# Patient Record
(demographics unavailable — no encounter records)

---

## 2024-12-04 NOTE — FAMILY HISTORY
[de-identified] : mom reportedly 160 cm and 115 lb; dad reportedly 170 cm and 155 lb [Initial Evaluation] : an initial evaluation [FreeTextEntry1] : dyspepsia, possible celiac disease

## 2024-12-04 NOTE — HISTORY OF PRESENT ILLNESS
[de-identified] : Sade is a 15 month old ex-37 week female toddler who is referred by Dr Kunz (PMD) in consultation for abdominal discomfort and milk intolerance.  Since 1 month old, she is gassy, distended, and wakes up every 1-2 hours crying overnight.  She goes back to sleep after being held for 5-10 min, but wakes up crying in 1-2 hours. She no longer feeds overnight since she is older but when she was younger, feeding did not help with crying.  Fine during daytime.  Had lead drawn (reportedly normal) at 9 mo.  Has not tried meds.  No travel.  Tried various formulas (in this order): regular Enfamil, NeuroPro, Gentlease, Sim Total Care 360, Lactaid milk but all unhelpful with gas. She became constipated at 13-14 mo on A2 then Lactaid milk. Drinks 400 ml of whole Lactaid milk, 80 ml of water, and a small amount of noodle soup a day.  Rarely drinks juice.  Eats 3 meals a day: rice, noodles, meat, veg.  Per parents, she has not gained much weight nor grown linearly the past few months.  The distention is always there but may be more after eating.  No vomiting.   Sade stools every 1>2 days, [place] 1, with straining and sometimes pain.  There was one episode with a small amount of "red."  There is no mucus, steatorrhea, or diarrhea.  Takes MIralax 5g in 6 oz water but takes med inconsistently and she does not drink all of it.

## 2024-12-04 NOTE — HISTORY OF PRESENT ILLNESS
[de-identified] : Sade is a 15 month old ex-37 week female toddler who is referred by Dr Kunz (PMD) in consultation for abdominal discomfort and milk intolerance.  Since 1 month old, she is gassy, distended, and wakes up every 1-2 hours crying overnight.  She goes back to sleep after being held for 5-10 min, but wakes up crying in 1-2 hours. She no longer feeds overnight since she is older but when she was younger, feeding did not help with crying.  Fine during daytime.  Had lead drawn (reportedly normal) at 9 mo.  Has not tried meds.  No travel.  Tried various formulas (in this order): regular Enfamil, NeuroPro, Gentlease, Sim Total Care 360, Lactaid milk but all unhelpful with gas. She became constipated at 13-14 mo on A2 then Lactaid milk. Drinks 400 ml of whole Lactaid milk, 80 ml of water, and a small amount of noodle soup a day.  Rarely drinks juice.  Eats 3 meals a day: rice, noodles, meat, veg.  Per parents, she has not gained much weight nor grown linearly the past few months.  The distention is always there but may be more after eating.  No vomiting.   Sade stools every 1>2 days, [place] 1, with straining and sometimes pain.  There was one episode with a small amount of "red."  There is no mucus, steatorrhea, or diarrhea.  Takes MIralax 5g in 6 oz water but takes med inconsistently and she does not drink all of it.

## 2024-12-04 NOTE — REASON FOR VISIT
[Consultation] : a consultation visit [Parents] : parents [Other: ______] : provided by DAMION [Time Spent: ____ minutes] : Total time spent using  services: [unfilled] minutes. The patient's primary language is not English thus required  services. [Interpreters_IDNumber] : [id number] (disconnected) then [id number] [FreeTextEntry3] : Mandarin

## 2024-12-04 NOTE — CONSULT LETTER
[Dear  ___] : Dear ~LAVERNE, [Consult Letter:] : I had the pleasure of evaluating your patient, [unfilled]. [Please see my note below.] : Please see my note below. [Consult Closing:] : Thank you very much for allowing me to participate in the care of this patient.  If you have any questions, please do not hesitate to contact me. [Sincerely,] : Sincerely, [FreeTextEntry3] : Nora Mcclelland MD [facility]

## 2024-12-04 NOTE — ASSESSMENT
[Educated Patient & Family about Diagnosis] : educated the patient and family about the diagnosis [Discussed with Family to Call in ____ week(s) for Test Results] : discussed with family to call in [unfilled] week(s) to obtain test results and with update on child's condition.  Family should call sooner if clinically indicated. [FreeTextEntry1] : ASSESSMENT: 1.  Abdominal distention, gas, crying overnight since 1 month old - unresponsive to various formulas/milk including Lactaid milk.  The differential diagnosis includes but is not limited to aerophagia (from crying overnight), cow's milk allergy, gas pain, constipation (which pt has recently though not in infancy), dysmotility, Hirschsprung's disease, celiac disease, metabolic disorder, SIBO, organomegaly. 2.  Acute constipation - inconsistent with Miralax, difficulty drinking all of med 3.  Reportedly poor weight gain and linear growth recently - weight 55% and length 4% today in clinic, weight-for-length 89%  PLAN: -  Labs to be drawn at [facility] [place].  Family wants to proceed with ordering TSH even if not covered by insurance.  -  KUB which family prefers to get tomorrow at [facility].  -  Contacted Radiology to help family schedule US via Mandarin . -  Will give samples of hypoallergenic formula: Cristina Palma Jr's Ped Standard 1.2 chocoCristina ortiz's Ped Peptide 1.0 vanilla.  -  Take probiotics consistently.  Start Mylicon infant.   -  Glycerin suppository or Windi for gas. -  Change Miralax to senna liquid 5 ml once daily (e-scribed).  -  Consider workup for Hirschsprung's disease, metabolic disorder, Flagyl. -  Requested AA to obtain PMD labs and growth curves. -  Follow up in GI clinic on 1/6 at 11a.  Plan reviewed.  Family to call if problems.  All questions answered.

## 2024-12-04 NOTE — CONSULT LETTER
[Dear  ___] : Dear ~LAVERNE, [Consult Letter:] : I had the pleasure of evaluating your patient, [unfilled]. [Please see my note below.] : Please see my note below. [Consult Closing:] : Thank you very much for allowing me to participate in the care of this patient.  If you have any questions, please do not hesitate to contact me. [Sincerely,] : Sincerely, [FreeTextEntry3] : Nora Mcclelland MD [facility]

## 2024-12-04 NOTE — REASON FOR VISIT
[Consultation] : a consultation visit [Parents] : parents [Other: ______] : provided by DAMION [Time Spent: ____ minutes] : Total time spent using  services: [unfilled] minutes. The patient's primary language is not English thus required  services. [Interpreters_IDNumber] : [id number] (disconnected) then [id number] [FreeTextEntry3] : Mandarin

## 2024-12-04 NOTE — PHYSICAL EXAM
[Well Developed] : well developed [Well Nourished] : well nourished [NAD] : in no acute distress [Alert and Active] : alert and active [Moist & Pink Mucous Membranes] : moist and pink mucous membranes [CTAB] : lungs clear to auscultation bilaterally [Regular Rate and Rhythm] : regular rate and rhythm [Normal S1, S2] : normal S1 and S2 [Soft] : soft  [Distended] : distended [Normal Bowel Sounds] : normal bowel sounds [No Back Lesion] : no back lesion [Normal rectal exam] : exam was normal [Rectal Exam Deferred] : rectal exam was deferred [Well-Perfused] : well-perfused [Short For Stated Age] : short for stated age [icteric] : anicteric [Respiratory Distress] : no respiratory distress  [Joint Swelling] : no joint swelling [Edema] : no edema [Cyanosis] : no cyanosis [Rash] : no rash [Jaundice] : no jaundice [FreeTextEntry1] : plump thighs > cheeks [de-identified] : uncooperative with exam  [de-identified] : uncooperative with exam  [de-identified] : uncooperative with exam  [de-identified] : did not speak during visit [de-identified] : crying during exam, comfortable when not examined

## 2024-12-04 NOTE — PHYSICAL EXAM
[Well Developed] : well developed [Well Nourished] : well nourished [NAD] : in no acute distress [Alert and Active] : alert and active [Moist & Pink Mucous Membranes] : moist and pink mucous membranes [CTAB] : lungs clear to auscultation bilaterally [Regular Rate and Rhythm] : regular rate and rhythm [Normal S1, S2] : normal S1 and S2 [Soft] : soft  [Distended] : distended [Normal Bowel Sounds] : normal bowel sounds [No Back Lesion] : no back lesion [Normal rectal exam] : exam was normal [Rectal Exam Deferred] : rectal exam was deferred [Well-Perfused] : well-perfused [Short For Stated Age] : short for stated age [icteric] : anicteric [Respiratory Distress] : no respiratory distress  [Joint Swelling] : no joint swelling [Edema] : no edema [Cyanosis] : no cyanosis [Rash] : no rash [Jaundice] : no jaundice [FreeTextEntry1] : plump thighs > cheeks [de-identified] : uncooperative with exam  [de-identified] : uncooperative with exam  [de-identified] : uncooperative with exam  [de-identified] : did not speak during visit [de-identified] : crying during exam, comfortable when not examined

## 2024-12-04 NOTE — ASSESSMENT
[Educated Patient & Family about Diagnosis] : educated the patient and family about the diagnosis [Discussed with Family to Call in ____ week(s) for Test Results] : discussed with family to call in [unfilled] week(s) to obtain test results and with update on child's condition.  Family should call sooner if clinically indicated. [FreeTextEntry1] : ASSESSMENT: 1.  Abdominal distention, gas, crying overnight since 1 month old - unresponsive to various formulas/milk including Lactaid milk.  The differential diagnosis includes but is not limited to aerophagia (from crying overnight), cow's milk allergy, gas pain, constipation (which pt has recently though not in infancy), dysmotility, Hirschsprung's disease, celiac disease, metabolic disorder, SIBO, organomegaly. 2.  Acute constipation - inconsistent with Miralax, difficulty drinking all of med 3.  Reportedly poor weight gain and linear growth recently - weight 55% and length 4% today in clinic, weight-for-length 89%  PLAN: -  [place] [facility] [place].  Family wants to proceed with ordering TSH even if not covered by insurance.  -  KUB which family prefers to get tomorrow at [facility].  -  Contacted Radiology to help family schedule US via Mandarin . -  Will give samples of hypoallergenic formula: Cristina Palma Jr's Ped Standard 1.2 chocoCristina ortiz's Ped Peptide 1.0 vanilla.  -  Take probiotics consistently.  Start Mylicon infant.   -  Glycerin suppository or Windi for gas. -  Change Miralax to senna liquid 5 ml once daily (e-scribed).  -  Consider workup for Hirschsprung's disease, metabolic disorder, Flagyl. -  Requested AA to obtain PMD labs and growth curves. -  Follow up in GI clinic on 1/6 at 11a.  Plan reviewed.  Family to call if problems.  All questions answered.

## 2024-12-04 NOTE — REVIEW OF SYSTEMS
[Negative] : Skin [Fever] : no fever [Asthma] : no asthma [Pneumonia] : no pneumonia [Murmur] : no murmur [Rash] : no rash [FreeTextEntry8] : 5 wet diapers a day  [FreeTextEntry1] : walks, says baba giles, delmy-delmy (little sister in Chinese0, bimal-bimal (grandmother in Chinese), bye-bye

## 2024-12-04 NOTE — PHYSICAL EXAM
[Well Developed] : well developed [Well Nourished] : well nourished [NAD] : in no acute distress [Alert and Active] : alert and active [Moist & Pink Mucous Membranes] : moist and pink mucous membranes [CTAB] : lungs clear to auscultation bilaterally [Regular Rate and Rhythm] : regular rate and rhythm [Normal S1, S2] : normal S1 and S2 [Soft] : soft  [Distended] : distended [Normal Bowel Sounds] : normal bowel sounds [No Back Lesion] : no back lesion [Normal rectal exam] : exam was normal [Rectal Exam Deferred] : rectal exam was deferred [Well-Perfused] : well-perfused [Short For Stated Age] : short for stated age [icteric] : anicteric [Respiratory Distress] : no respiratory distress  [Joint Swelling] : no joint swelling [Edema] : no edema [Cyanosis] : no cyanosis [Rash] : no rash [Jaundice] : no jaundice [FreeTextEntry1] : plump thighs > cheeks [de-identified] : uncooperative with exam  [de-identified] : uncooperative with exam  [de-identified] : uncooperative with exam  [de-identified] : did not speak during visit [de-identified] : crying during exam, comfortable when not examined

## 2024-12-04 NOTE — REASON FOR VISIT
[Consultation] : a consultation visit [Parents] : parents [Other: ______] : provided by DAMION [Time Spent: ____ minutes] : Total time spent using  services: [unfilled] minutes. The patient's primary language is not English thus required  services. [Interpreters_IDNumber] : [id number] (disconnected) then [id number] [FreeTextEntry3] : Mandarin

## 2024-12-04 NOTE — ASSESSMENT
[Educated Patient & Family about Diagnosis] : educated the patient and family about the diagnosis [Discussed with Family to Call in ____ week(s) for Test Results] : discussed with family to call in [unfilled] week(s) to obtain test results and with update on child's condition.  Family should call sooner if clinically indicated. [FreeTextEntry1] : ASSESSMENT: 1.  Abdominal distention, gas, crying overnight since 1 month old - unresponsive to various formulas/milk including Lactaid milk.  The differential diagnosis includes but is not limited to aerophagia (from crying overnight), cow's milk allergy, gas pain, constipation (which pt has recently though not in infancy), dysmotility, Hirschsprung's disease, celiac disease, metabolic disorder, SIBO, organomegaly. 2.  Acute constipation - inconsistent with Miralax, difficulty drinking all of med 3.  Reportedly poor weight gain and linear growth recently - weight 55% and length 4% today in clinic, weight-for-length 89%  PLAN: -  [place] [facility].  Family wants to proceed with ordering TSH even if not covered by insurance.  -  KUB which family prefers to get tomorrow at [facility] Radiology.  -  Contacted Radiology to help family schedule US via Mandarin . -  Will give samples of hypoallergenic formula: Cristina Palma Jr's Ped Standard 1.2 chocoCristina ortiz's Ped Peptide 1.0 vanilla.  -  Take probiotics consistently.  Start Mylicon infant.   -  Glycerin suppository or Windi for gas. -  Change Miralax to senna liquid 5 ml once daily (e-scribed).  -  Consider workup for Hirschsprung's disease, metabolic disorder, Flagyl. -  Requested AA to obtain PMD labs and growth curves. -  Follow up in GI clinic on 1/6 at 11a.  Plan reviewed.  Family to call if problems.  All questions answered.

## 2024-12-04 NOTE — CONSULT LETTER
[Dear  ___] : Dear ~LAVERNE, [Consult Letter:] : I had the pleasure of evaluating your patient, [unfilled]. [Please see my note below.] : Please see my note below. [Consult Closing:] : Thank you very much for allowing me to participate in the care of this patient.  If you have any questions, please do not hesitate to contact me. [Sincerely,] : Sincerely, [FreeTextEntry3] : Nora Mcclelland MD [facility]

## 2024-12-04 NOTE — PAST MEDICAL HISTORY
[At ___ Weeks Gestation] : at [unfilled] weeks gestation [Normal Vaginal Route] : by normal vaginal route [None] : there were no delivery complications [] : There were no problems passing meconium within 24 - 48 hrs of life [FreeTextEntry1] : 5 lb 6 oz

## 2024-12-04 NOTE — HISTORY OF PRESENT ILLNESS
[de-identified] : Sade is a 15 month old ex-37 week female toddler who is referred by Dr Kunz (PMD) in consultation for abdominal discomfort and milk intolerance.  Since 1 month old, she is gassy, distended, and wakes up every 1-2 hours crying overnight.  She goes back to sleep after being held for 5-10 min, but wakes up crying in 1-2 hours. She no longer feeds overnight since she is older but when she was younger, feeding did not help with crying.  Fine during daytime.  Had lead drawn (reportedly normal) at 9 mo.  Has not tried meds.  No travel.  Tried various formulas (in this order): regular Enfamil, NeuroPro, Gentlease, Sim Total Care 360, Lactaid milk but all unhelpful with gas. She became constipated at 13-14 mo on A2 then Lactaid milk. Drinks 400 ml of whole Lactaid milk, 80 ml of water, and a small amount of noodle soup a day.  Rarely drinks juice.  Eats 3 meals a day: rice, noodles, meat, veg.  Per parents, she has not gained much weight nor grown linearly the past few months.  The distention is always there but may be more after eating.  No vomiting.   Sade stools every 1>2 days, [place] 1, with straining and sometimes pain.  There was one episode with a small amount of "red."  There is no mucus, steatorrhea, or diarrhea.  Takes MIralax 5g in 6 oz water but takes med inconsistently and she does not drink all of it.

## 2024-12-04 NOTE — SOCIAL HISTORY
[Parent(s)] : parent(s) [FreeTextEntry1] : does not attend   O-Z Plasty Text: The defect edges were debeveled with a #15 scalpel blade.  Given the location of the defect, shape of the defect and the proximity to free margins an O-Z plasty (double transposition flap) was deemed most appropriate.  Using a sterile surgical marker, the appropriate transposition flaps were drawn incorporating the defect and placing the expected incisions within the relaxed skin tension lines where possible.    The area thus outlined was incised deep to adipose tissue with a #15 scalpel blade.  The skin margins were undermined to an appropriate distance in all directions utilizing iris scissors.  Hemostasis was achieved with electrocautery.  The flaps were then transposed into place, one clockwise and the other counterclockwise, and anchored with interrupted buried subcutaneous sutures.

## 2025-01-08 NOTE — CONSULT LETTER
[Dear  ___] : Dear ~LAVERNE, [Consult Letter:] : I had the pleasure of evaluating your patient, [unfilled]. [Please see my note below.] : Please see my note below. [Consult Closing:] : Thank you very much for allowing me to participate in the care of this patient.  If you have any questions, please do not hesitate to contact me. [Sincerely,] : Sincerely, [FreeTextEntry3] : Nora Mcclelland MD [facility]

## 2025-01-08 NOTE — PHYSICAL EXAM
[Well Developed] : well developed [Well Nourished] : well nourished [NAD] : in no acute distress [Alert and Active] : alert and active [Short For Stated Age] : short for stated age [Moist & Pink Mucous Membranes] : moist and pink mucous membranes [CTAB] : lungs clear to auscultation bilaterally [Regular Rate and Rhythm] : regular rate and rhythm [Normal S1, S2] : normal S1 and S2 [Soft] : soft  [Distended] : distended [Normal Bowel Sounds] : normal bowel sounds [No Back Lesion] : no back lesion [Normal rectal exam] : exam was normal [Rectal Exam Deferred] : rectal exam was deferred [Well-Perfused] : well-perfused [de-identified] : uncooperative with exam  [icteric] : anicteric [Respiratory Distress] : no respiratory distress  [Joint Swelling] : no joint swelling [Edema] : no edema [Cyanosis] : no cyanosis [Rash] : no rash [Jaundice] : no jaundice [FreeTextEntry1] : plump thighs > cheeks [de-identified] : uncooperative with exam  [de-identified] : uncooperative with exam  [de-identified] : did not speak during visit [de-identified] : crying during exam, comfortable when not examined

## 2025-01-08 NOTE — HISTORY OF PRESENT ILLNESS
[de-identified] : Sade is a 16 month old ex-37 week female toddler who is here for follow up of fussiness overnight, abdominal distention, constipation and gas.  Since 1 month old, she is gassy, distended, and wakes up every 1-2 hours crying overnight.  She goes back to sleep after being held for 5-10 min, but wakes up crying in 1-2 hours. She no longer feeds overnight since she is older but when she was younger, feeding did not help with crying.  Fine during daytime.    Since the last visit on 12/2, the constipation and distention both significantly improved after switching from Lactaid whole milk to Lactaid 2% milk on Jan 2.  The stool consistency changed from [place] 1 to loose. She stools once a day, without straining or pain.  There is no blood, mucus, steatorrhea, or diarrhea. Off laxatives.  She refused supp. Did not start Mylicon.    Sade drinks Lactaid 2% milk 7-8 oz BID-TID.  Did not like Elecare Jr Cristina Farm's chocolate or vanilla. [In the past, she tried various formulas (in this order): regular Enfamil, NeuroPro, Gentlease, Sim Total Care 360, Lactaid milk but all unhelpful with gas. She became constipated at 13-14 mo on A2 then Lactaid milk.]   Eats 3 meals a day: rice, noodles, meat, veg.  Gained weight from 55% to 60%.  No vomiting.    Sade continues to wake up overnight.  Last feed is at 8-9p, she sleeps at 9:30p, cries at 10p.  Wakes up 30 min after bedtime regardless of when she sleeps.  Then wakes up every 1-2 hour, may or may not cry.  Sometimes does not wake up but tosses in sleep. No longer feeds overnight.  Happy during daytime.   PMD labs 6/3/24: - CBC with plt 536 - normal lead level  GI WORKUP:  - low bicarb 12 (took 12h to process) with high anion gap 19.  Repeat bicarb 21 without anion gap. - normal CBC (including plt) but ANC 1400 - normal CRP, lipase, TSH, celiac panel with normal serum IgA - Plasma amino acid analysis reveals variations from the normal reference range for several amino acids.  The pattern is not suggestive of a specific aminoacidopathy. This pattern may be due to differences in normal metabolism, patient diet, or treatment. - Plasma acylcarnitine analysis revealed an elevation of C3. The elevation of C3 is mild and is unlikely of clinical significance. However, if an inborn error of metabolism is strong suspected, consider obtaining urine organic acids to correlate with this result. -  normal T+F carnitine -  UA with 30 protein and trace leuk (neg nit), pH 6.0 - normal celiac panel with serum IgA - normal US - KUB with moderate stool burden

## 2025-01-08 NOTE — CONSULT LETTER
[Dear  ___] : Dear ~LAVERNE, [Consult Letter:] : I had the pleasure of evaluating your patient, [unfilled]. [Please see my note below.] : Please see my note below. [Consult Closing:] : Thank you very much for allowing me to participate in the care of this patient.  If you have any questions, please do not hesitate to contact me. [Sincerely,] : Sincerely, [FreeTextEntry3] : Nora Mcclelland MD [facility]

## 2025-01-08 NOTE — REASON FOR VISIT
[Parents] : parents [Other: ______] : provided by DAMION [Consultation Follow Up] : a consultation follow up  [Time Spent: ____ minutes] : Total time spent using  services: [unfilled] minutes. The patient's primary language is not English thus required  services. [Interpreters_IDNumber] : [id number] [FreeTextEntry3] : Mandarin

## 2025-01-08 NOTE — ASSESSMENT
[Discussed with Family to Call in ____ week(s) for Test Results] : discussed with family to call in [unfilled] week(s) to obtain test results and with update on child's condition.  Family should call sooner if clinically indicated. [FreeTextEntry1] : ASSESSMENT: 1.  Abdominal distention, gas, crying overnight since 1 month old - unresponsive to various formulas/milk including Lactaid milk.  Distention and constipation improved when changed Lactaid whole milk to Lactaid 2% milk.  Gaining weight.  2.  Workup significant for mild neutropenia (ANC 1400), and UA with 30 protein and trace leuk (neg nit).  No specific metabolic disorder found on workup.  Normal US.  KUB with moderate stool burden.   PLAN: -  Informed family that pt should be whole milk at this time so can gradually transition back to whole milk.  -  Prune juice/puree > senna liquid 5 ml once daily prn constipation.   -  Continue probiotics.  Mylicon qhs to see if helpful with overnight fussiness. -  Divide bedtime feed into two smaller feeds.  Start Pepcid 0.5 mg/kg before nighttime feed (e-scribed).  -  Repeat CBC/diff and UA, per PMD (both ordered). Confirmed with lab that UrOA pending result.  -  FOBT.  Consider calprotectin.   -  Family deferred EGD and seeing Metabolic.  Requested family to trial one night where they feed pt when she wakes up crying overnight to see if it helps, then call me with result.  If helpful, will refer to Metabolic.   -  Follow up in GI clinic in 2 months. Family to call if problems.  All questions answered.   ADDENDUM: Jan 6, 2025 - Normal urine OA. Requested [place] [facility] to call family with result.  MOC states pt still cried after taking 100 ml of formula overnight.

## 2025-01-08 NOTE — HISTORY OF PRESENT ILLNESS
[de-identified] : Sade is a 16 month old ex-37 week female toddler who is here for follow up of fussiness overnight, abdominal distention, constipation and gas.  Since 1 month old, she is gassy, distended, and wakes up every 1-2 hours crying overnight.  She goes back to sleep after being held for 5-10 min, but wakes up crying in 1-2 hours. She no longer feeds overnight since she is older but when she was younger, feeding did not help with crying.  Fine during daytime.    Since the last visit on 12/2, the constipation and distention both significantly improved after switching from Lactaid whole milk to Lactaid 2% milk on Jan 2.  The stool consistency changed from [place] 1 to loose. She stools once a day, without straining or pain.  There is no blood, mucus, steatorrhea, or diarrhea. Off laxatives.  She refused supp. Did not start Mylicon.    Sade drinks Lactaid 2% milk 7-8 oz BID-TID.  Did not like [employer]'s chocolate or vanilla. [In the past, she tried various formulas (in this order): regular Enfamil, NeuroPro, Gentlease, Sim Total Care 360, Lactaid milk but all unhelpful with gas. She became constipated at 13-14 mo on A2 then Lactaid milk.]   Eats 3 meals a day: rice, noodles, meat, veg.  Gained weight from 55% to 60%.  No vomiting.    Sade continues to wake up overnight.  Last feed is at 8-9p, she sleeps at 9:30p, cries at 10p.  Wakes up 30 min after bedtime regardless of when she sleeps.  Then wakes up every 1-2 hour, may or may not cry.  Sometimes does not wake up but tosses in sleep. No longer feeds overnight.  Happy during daytime.   PMD labs 6/3/24: - CBC with plt 536 - normal lead level  GI WORKUP:  - low bicarb 12 (took 12h to process) with high anion gap 19.  Repeat bicarb 21 without anion gap. - normal CBC (including plt) but ANC 1400 - normal CRP, lipase, TSH, celiac panel with normal serum IgA - Plasma amino acid analysis reveals variations from the normal reference range for several amino acids.  The pattern is not suggestive of a specific aminoacidopathy. This pattern may be due to differences in normal metabolism, patient diet, or treatment. - Plasma acylcarnitine analysis revealed an elevation of C3. The elevation of C3 is mild and is unlikely of clinical significance. However, if an inborn error of metabolism is strong suspected, consider obtaining urine organic acids to correlate with this result. -  normal T+F carnitine -  UA with 30 protein and trace leuk (neg nit), pH 6.0 - normal celiac panel with serum IgA - normal US - KUB with moderate stool burden

## 2025-01-08 NOTE — PHYSICAL EXAM
[Well Developed] : well developed [Well Nourished] : well nourished [NAD] : in no acute distress [Alert and Active] : alert and active [Short For Stated Age] : short for stated age [Moist & Pink Mucous Membranes] : moist and pink mucous membranes [CTAB] : lungs clear to auscultation bilaterally [Regular Rate and Rhythm] : regular rate and rhythm [Normal S1, S2] : normal S1 and S2 [Soft] : soft  [Distended] : distended [Normal Bowel Sounds] : normal bowel sounds [No Back Lesion] : no back lesion [Normal rectal exam] : exam was normal [Rectal Exam Deferred] : rectal exam was deferred [Well-Perfused] : well-perfused [de-identified] : uncooperative with exam  [icteric] : anicteric [Respiratory Distress] : no respiratory distress  [Joint Swelling] : no joint swelling [Edema] : no edema [Cyanosis] : no cyanosis [Rash] : no rash [Jaundice] : no jaundice [FreeTextEntry1] : plump thighs > cheeks [de-identified] : uncooperative with exam  [de-identified] : uncooperative with exam  [de-identified] : did not speak during visit [de-identified] : crying during exam, comfortable when not examined

## 2025-01-08 NOTE — REVIEW OF SYSTEMS
[Negative] : Allergy/Immunology [Eczema] : eczema [Fever] : no fever [Asthma] : no asthma [Pneumonia] : no pneumonia [Murmur] : no murmur [Rash] : no rash [FreeTextEntry8] : 5 wet diapers a day  [FreeTextEntry1] : run, says baba giles, 4-5 words

## 2025-01-08 NOTE — PHYSICAL EXAM
[Well Developed] : well developed [Well Nourished] : well nourished [NAD] : in no acute distress [Alert and Active] : alert and active [Short For Stated Age] : short for stated age [Moist & Pink Mucous Membranes] : moist and pink mucous membranes [CTAB] : lungs clear to auscultation bilaterally [Regular Rate and Rhythm] : regular rate and rhythm [Normal S1, S2] : normal S1 and S2 [Soft] : soft  [Distended] : distended [Normal Bowel Sounds] : normal bowel sounds [No Back Lesion] : no back lesion [Normal rectal exam] : exam was normal [Rectal Exam Deferred] : rectal exam was deferred [Well-Perfused] : well-perfused [de-identified] : uncooperative with exam  [icteric] : anicteric [Respiratory Distress] : no respiratory distress  [Joint Swelling] : no joint swelling [Edema] : no edema [Cyanosis] : no cyanosis [Rash] : no rash [Jaundice] : no jaundice [FreeTextEntry1] : plump thighs > cheeks [de-identified] : uncooperative with exam  [de-identified] : uncooperative with exam  [de-identified] : did not speak during visit [de-identified] : crying during exam, comfortable when not examined

## 2025-01-08 NOTE — CONSULT LETTER
[Dear  ___] : Dear ~LAVERNE, [Consult Letter:] : I had the pleasure of evaluating your patient, [unfilled]. [Please see my note below.] : Please see my note below. [Consult Closing:] : Thank you very much for allowing me to participate in the care of this patient.  If you have any questions, please do not hesitate to contact me. [Sincerely,] : Sincerely, [FreeTextEntry3] : Nora Mcclelland MD [facility]

## 2025-01-08 NOTE — REASON FOR VISIT
[Parents] : parents [Other: ______] : provided by DAMION [Consultation Follow Up] : a consultation follow up  [Time Spent: ____ minutes] : Total time spent using  services: [unfilled] minutes. The patient's primary language is not English thus required  services. [Interpreters_IDNumber] : [id number] [FreeTextEntry3] : Mandarin

## 2025-01-08 NOTE — REASON FOR VISIT
[Parents] : parents [Other: ______] : provided by DAMION [Consultation Follow Up] : a consultation follow up  [Time Spent: ____ minutes] : Total time spent using  services: [unfilled] minutes. The patient's primary language is not English thus required  services. [Interpreters_IDNumber] : [id number] [FreeTextEntry3] : Mandarin

## 2025-01-08 NOTE — PHYSICAL EXAM
[Well Developed] : well developed [Well Nourished] : well nourished [NAD] : in no acute distress [Alert and Active] : alert and active [Short For Stated Age] : short for stated age [Moist & Pink Mucous Membranes] : moist and pink mucous membranes [CTAB] : lungs clear to auscultation bilaterally [Regular Rate and Rhythm] : regular rate and rhythm [Normal S1, S2] : normal S1 and S2 [Soft] : soft  [Distended] : distended [Normal Bowel Sounds] : normal bowel sounds [No Back Lesion] : no back lesion [Normal rectal exam] : exam was normal [Rectal Exam Deferred] : rectal exam was deferred [Well-Perfused] : well-perfused [de-identified] : uncooperative with exam  [icteric] : anicteric [Respiratory Distress] : no respiratory distress  [Joint Swelling] : no joint swelling [Edema] : no edema [Cyanosis] : no cyanosis [Rash] : no rash [Jaundice] : no jaundice [FreeTextEntry1] : plump thighs > cheeks [de-identified] : uncooperative with exam  [de-identified] : uncooperative with exam  [de-identified] : did not speak during visit [de-identified] : crying during exam, comfortable when not examined

## 2025-01-08 NOTE — REASON FOR VISIT
[Parents] : parents [Other: ______] : provided by DAMION [Consultation Follow Up] : a consultation follow up  [Time Spent: ____ minutes] : Total time spent using  services: [unfilled] minutes. The patient's primary language is not English thus required  services. [Interpreters_IDNumber] : [id number] [FreeTextEntry3] : Mandarin

## 2025-01-08 NOTE — HISTORY OF PRESENT ILLNESS
[de-identified] : Sade is a 16 month old ex-37 week female toddler who is here for follow up of fussiness overnight, abdominal distention, constipation and gas.  Since 1 month old, she is gassy, distended, and wakes up every 1-2 hours crying overnight.  She goes back to sleep after being held for 5-10 min, but wakes up crying in 1-2 hours. She no longer feeds overnight since she is older but when she was younger, feeding did not help with crying.  Fine during daytime.    Since the last visit on 12/2, the constipation and distention both significantly improved after switching from Lactaid whole milk to Lactaid 2% milk on Jan 2.  The stool consistency changed from [place] 1 to loose. She stools once a day, without straining or pain.  There is no blood, mucus, steatorrhea, or diarrhea. Off laxatives.  She refused supp. Did not start Mylicon.    Sade drinks Lactaid 2% milk 7-8 oz BID-TID.  Did not like [employer]'s chocolate or vanilla. [In the past, she tried various formulas (in this order): regular Enfamil, NeuroPro, Gentlease, Sim Total Care 360, Lactaid milk but all unhelpful with gas. She became constipated at 13-14 mo on A2 then Lactaid milk.]   Eats 3 meals a day: rice, noodles, meat, veg.  Gained weight from 55% to 60%.  No vomiting.    Sade continues to wake up overnight.  Last feed is at 8-9p, she sleeps at 9:30p, cries at 10p.  Wakes up 30 min after bedtime regardless of when she sleeps.  Then wakes up every 1-2 hour, may or may not cry.  Sometimes does not wake up but tosses in sleep. No longer feeds overnight.  Happy during daytime.   PMD labs 6/3/24: - CBC with plt 536 - normal lead level  GI WORKUP:  - low bicarb 12 (took 12h to process) with high anion gap 19.  Repeat bicarb 21 without anion gap. - normal CBC (including plt) but ANC 1400 - normal CRP, lipase, TSH, celiac panel with normal serum IgA - Plasma amino acid analysis reveals variations from the normal reference range for several amino acids.  The pattern is not suggestive of a specific aminoacidopathy. This pattern may be due to differences in normal metabolism, patient diet, or treatment. - Plasma acylcarnitine analysis revealed an elevation of C3. The elevation of C3 is mild and is unlikely of clinical significance. However, if an inborn error of metabolism is strong suspected, consider obtaining urine organic acids to correlate with this result. -  normal T+F carnitine -  UA with 30 protein and trace leuk (neg nit), pH 6.0 - normal celiac panel with serum IgA - normal US - KUB with moderate stool burden

## 2025-01-08 NOTE — CONSULT LETTER
[Dear  ___] : Dear ~LAVERNE, [Consult Letter:] : I had the pleasure of evaluating your patient, [unfilled]. [Please see my note below.] : Please see my note below. [Consult Closing:] : Thank you very much for allowing me to participate in the care of this patient.  If you have any questions, please do not hesitate to contact me. [Sincerely,] : Sincerely, [FreeTextEntry3] : Nora Mcclelland MD [facility]

## 2025-01-08 NOTE — HISTORY OF PRESENT ILLNESS
[de-identified] : Sade is a 16 month old ex-37 week female toddler who is here for follow up of fussiness overnight, abdominal distention, constipation and gas.  Since 1 month old, she is gassy, distended, and wakes up every 1-2 hours crying overnight.  She goes back to sleep after being held for 5-10 min, but wakes up crying in 1-2 hours. She no longer feeds overnight since she is older but when she was younger, feeding did not help with crying.  Fine during daytime.    Since the last visit on 12/2, the constipation and distention both significantly improved after switching from Lactaid whole milk to Lactaid 2% milk on Jan 2.  The stool consistency changed from [place] 1 to loose. She stools once a day, without straining or pain.  There is no blood, mucus, steatorrhea, or diarrhea. Off laxatives.  She refused supp. Did not start Mylicon.    Sade drinks Lactaid 2% milk 7-8 oz BID-TID.  Did not like [employer]'s chocolate or vanilla. [In the past, she tried various formulas (in this order): regular Enfamil, NeuroPro, Gentlease, Sim Total Care 360, Lactaid milk but all unhelpful with gas. She became constipated at 13-14 mo on A2 then Lactaid milk.]   Eats 3 meals a day: rice, noodles, meat, veg.  Gained weight from 55% to 60%.  No vomiting.    Sade continues to wake up overnight.  Last feed is at 8-9p, she sleeps at 9:30p, cries at 10p.  Wakes up 30 min after bedtime regardless of when she sleeps.  Then wakes up every 1-2 hour, may or may not cry.  Sometimes does not wake up but tosses in sleep. No longer feeds overnight.  Happy during daytime.   PMD labs 6/3/24: - CBC with plt 536 - normal lead level  GI WORKUP:  - low bicarb 12 (took 12h to process) with high anion gap 19.  Repeat bicarb 21 without anion gap. - normal CBC (including plt) but ANC 1400 - normal CRP, lipase, TSH, celiac panel with normal serum IgA - Plasma amino acid analysis reveals variations from the normal reference range for several amino acids.  The pattern is not suggestive of a specific aminoacidopathy. This pattern may be due to differences in normal metabolism, patient diet, or treatment. - Plasma acylcarnitine analysis revealed an elevation of C3. The elevation of C3 is mild and is unlikely of clinical significance. However, if an inborn error of metabolism is strong suspected, consider obtaining urine organic acids to correlate with this result. -  normal T+F carnitine -  UA with 30 protein and trace leuk (neg nit), pH 6.0 - normal celiac panel with serum IgA - normal US - KUB with moderate stool burden